# Patient Record
Sex: FEMALE | Race: WHITE | NOT HISPANIC OR LATINO | Employment: UNEMPLOYED | URBAN - METROPOLITAN AREA
[De-identification: names, ages, dates, MRNs, and addresses within clinical notes are randomized per-mention and may not be internally consistent; named-entity substitution may affect disease eponyms.]

---

## 2019-01-16 ENCOUNTER — OFFICE VISIT (OUTPATIENT)
Dept: FAMILY MEDICINE | Facility: CLINIC | Age: 34
End: 2019-01-16
Payer: COMMERCIAL

## 2019-01-16 ENCOUNTER — LAB VISIT (OUTPATIENT)
Dept: LAB | Facility: HOSPITAL | Age: 34
End: 2019-01-16
Attending: FAMILY MEDICINE
Payer: COMMERCIAL

## 2019-01-16 VITALS
WEIGHT: 164 LBS | DIASTOLIC BLOOD PRESSURE: 106 MMHG | SYSTOLIC BLOOD PRESSURE: 152 MMHG | HEIGHT: 65 IN | TEMPERATURE: 98 F | HEART RATE: 77 BPM | BODY MASS INDEX: 27.32 KG/M2 | OXYGEN SATURATION: 97 %

## 2019-01-16 DIAGNOSIS — Z00.00 ROUTINE PHYSICAL EXAMINATION: ICD-10-CM

## 2019-01-16 DIAGNOSIS — Z30.09 BIRTH CONTROL COUNSELING: ICD-10-CM

## 2019-01-16 DIAGNOSIS — F41.9 ANXIETY: ICD-10-CM

## 2019-01-16 DIAGNOSIS — R03.0 ELEVATED BLOOD PRESSURE READING: ICD-10-CM

## 2019-01-16 DIAGNOSIS — Z00.00 ROUTINE PHYSICAL EXAMINATION: Primary | ICD-10-CM

## 2019-01-16 DIAGNOSIS — N39.0 URINARY TRACT INFECTION WITHOUT HEMATURIA, SITE UNSPECIFIED: ICD-10-CM

## 2019-01-16 DIAGNOSIS — K21.9 GASTROESOPHAGEAL REFLUX DISEASE, ESOPHAGITIS PRESENCE NOT SPECIFIED: ICD-10-CM

## 2019-01-16 DIAGNOSIS — Z01.419 ENCOUNTER FOR GYNECOLOGICAL EXAMINATION WITHOUT ABNORMAL FINDING: ICD-10-CM

## 2019-01-16 DIAGNOSIS — G47.00 INSOMNIA, UNSPECIFIED TYPE: ICD-10-CM

## 2019-01-16 LAB
25(OH)D3+25(OH)D2 SERPL-MCNC: 19 NG/ML
ALBUMIN SERPL BCP-MCNC: 4.1 G/DL
ALP SERPL-CCNC: 71 U/L
ALT SERPL W/O P-5'-P-CCNC: 8 U/L
ANION GAP SERPL CALC-SCNC: 10 MMOL/L
AST SERPL-CCNC: 16 U/L
B-HCG UR QL: NEGATIVE
BASOPHILS # BLD AUTO: 0.07 K/UL
BASOPHILS NFR BLD: 1.5 %
BILIRUB SERPL-MCNC: 0.3 MG/DL
BUN SERPL-MCNC: 6 MG/DL
CALCIUM SERPL-MCNC: 9.4 MG/DL
CHLORIDE SERPL-SCNC: 104 MMOL/L
CHOLEST SERPL-MCNC: 197 MG/DL
CHOLEST/HDLC SERPL: 3 {RATIO}
CO2 SERPL-SCNC: 26 MMOL/L
CREAT SERPL-MCNC: 0.8 MG/DL
CTP QC/QA: YES
DIFFERENTIAL METHOD: ABNORMAL
EOSINOPHIL # BLD AUTO: 0.2 K/UL
EOSINOPHIL NFR BLD: 3.5 %
ERYTHROCYTE [DISTWIDTH] IN BLOOD BY AUTOMATED COUNT: 14.9 %
EST. GFR  (AFRICAN AMERICAN): >60 ML/MIN/1.73 M^2
EST. GFR  (NON AFRICAN AMERICAN): >60 ML/MIN/1.73 M^2
ESTIMATED AVG GLUCOSE: 100 MG/DL
GLUCOSE SERPL-MCNC: 88 MG/DL
HBA1C MFR BLD HPLC: 5.1 %
HCT VFR BLD AUTO: 33.8 %
HDLC SERPL-MCNC: 65 MG/DL
HDLC SERPL: 33 %
HGB BLD-MCNC: 9.9 G/DL
IMM GRANULOCYTES # BLD AUTO: 0.01 K/UL
IMM GRANULOCYTES NFR BLD AUTO: 0.2 %
INR PPP: 1
LDLC SERPL CALC-MCNC: 114.2 MG/DL
LYMPHOCYTES # BLD AUTO: 1.4 K/UL
LYMPHOCYTES NFR BLD: 29.5 %
MCH RBC QN AUTO: 22.3 PG
MCHC RBC AUTO-ENTMCNC: 29.3 G/DL
MCV RBC AUTO: 76 FL
MONOCYTES # BLD AUTO: 0.4 K/UL
MONOCYTES NFR BLD: 7.3 %
NEUTROPHILS # BLD AUTO: 2.8 K/UL
NEUTROPHILS NFR BLD: 58 %
NONHDLC SERPL-MCNC: 132 MG/DL
NRBC BLD-RTO: 0 /100 WBC
PLATELET # BLD AUTO: 308 K/UL
PMV BLD AUTO: 12.7 FL
POTASSIUM SERPL-SCNC: 4 MMOL/L
PROT SERPL-MCNC: 7.7 G/DL
PROTHROMBIN TIME: 10.7 SEC
RBC # BLD AUTO: 4.43 M/UL
SODIUM SERPL-SCNC: 140 MMOL/L
T4 FREE SERPL-MCNC: 1 NG/DL
TRIGL SERPL-MCNC: 89 MG/DL
TSH SERPL DL<=0.005 MIU/L-ACNC: 0.86 UIU/ML
WBC # BLD AUTO: 4.81 K/UL

## 2019-01-16 PROCEDURE — 99999 PR PBB SHADOW E&M-NEW PATIENT-LVL III: ICD-10-PCS | Mod: PBBFAC,,, | Performed by: FAMILY MEDICINE

## 2019-01-16 PROCEDURE — 84439 ASSAY OF FREE THYROXINE: CPT

## 2019-01-16 PROCEDURE — 99385 PREV VISIT NEW AGE 18-39: CPT | Mod: S$GLB,,, | Performed by: FAMILY MEDICINE

## 2019-01-16 PROCEDURE — 99385 PR PREVENTIVE VISIT,NEW,18-39: ICD-10-PCS | Mod: S$GLB,,, | Performed by: FAMILY MEDICINE

## 2019-01-16 PROCEDURE — 85610 PROTHROMBIN TIME: CPT

## 2019-01-16 PROCEDURE — 81025 POCT URINE PREGNANCY: ICD-10-PCS | Mod: S$GLB,,, | Performed by: FAMILY MEDICINE

## 2019-01-16 PROCEDURE — 82306 VITAMIN D 25 HYDROXY: CPT

## 2019-01-16 PROCEDURE — 99999 PR PBB SHADOW E&M-NEW PATIENT-LVL III: CPT | Mod: PBBFAC,,, | Performed by: FAMILY MEDICINE

## 2019-01-16 PROCEDURE — 80053 COMPREHEN METABOLIC PANEL: CPT

## 2019-01-16 PROCEDURE — 84443 ASSAY THYROID STIM HORMONE: CPT

## 2019-01-16 PROCEDURE — 85025 COMPLETE CBC W/AUTO DIFF WBC: CPT

## 2019-01-16 PROCEDURE — 83036 HEMOGLOBIN GLYCOSYLATED A1C: CPT

## 2019-01-16 PROCEDURE — 36415 COLL VENOUS BLD VENIPUNCTURE: CPT | Mod: PO

## 2019-01-16 PROCEDURE — 81025 URINE PREGNANCY TEST: CPT | Mod: S$GLB,,, | Performed by: FAMILY MEDICINE

## 2019-01-16 PROCEDURE — 80061 LIPID PANEL: CPT

## 2019-01-16 RX ORDER — PANTOPRAZOLE SODIUM 40 MG/1
40 TABLET, DELAYED RELEASE ORAL DAILY
Qty: 90 TABLET | Refills: 3 | Status: SHIPPED | OUTPATIENT
Start: 2019-01-16

## 2019-01-16 RX ORDER — MEDROXYPROGESTERONE ACETATE 150 MG/ML
150 INJECTION, SUSPENSION INTRAMUSCULAR
Status: SHIPPED | OUTPATIENT
Start: 2019-01-16 | End: 2020-07-09

## 2019-01-16 RX ORDER — ESCITALOPRAM OXALATE 20 MG/1
20 TABLET ORAL DAILY
Qty: 90 TABLET | Refills: 3 | Status: SHIPPED | OUTPATIENT
Start: 2019-01-16

## 2019-01-16 RX ORDER — FLUTICASONE PROPIONATE 50 MCG
1 SPRAY, SUSPENSION (ML) NASAL DAILY
COMMUNITY

## 2019-01-16 RX ORDER — TRAZODONE HYDROCHLORIDE 100 MG/1
100 TABLET ORAL NIGHTLY
Qty: 90 TABLET | Refills: 3 | Status: SHIPPED | OUTPATIENT
Start: 2019-01-16

## 2019-01-16 RX ORDER — TRAZODONE HYDROCHLORIDE 50 MG/1
50 TABLET ORAL NIGHTLY
COMMUNITY
End: 2019-01-16 | Stop reason: SDUPTHER

## 2019-01-16 RX ORDER — ESCITALOPRAM OXALATE 20 MG/1
20 TABLET ORAL DAILY
COMMUNITY
End: 2019-01-16 | Stop reason: SDUPTHER

## 2019-01-16 RX ORDER — NITROFURANTOIN 25; 75 MG/1; MG/1
100 CAPSULE ORAL 2 TIMES DAILY
Qty: 14 CAPSULE | Refills: 0 | Status: SHIPPED | OUTPATIENT
Start: 2019-01-16

## 2019-01-16 RX ORDER — PANTOPRAZOLE SODIUM 40 MG/1
40 TABLET, DELAYED RELEASE ORAL DAILY
COMMUNITY
End: 2019-01-16 | Stop reason: SDUPTHER

## 2019-01-16 NOTE — PROGRESS NOTES
[facility]  Progress Note    SUBJECTIVE:     Chief Complaint   Patient presents with    [facility]    Annual Exam       HPI   Sandra Morales  is a 33 y.o. female here to [facility] and for annual exam. Would like to discuss birth control. Patient has no other new complaints/problems at this time.      Review of patient's allergies indicates:   Allergen Reactions    Pcn [penicillins] Anaphylaxis    Keflex [cephalexin] Itching       Past Medical History:   Diagnosis Date    Anxiety     Depression     PCOS (polycystic ovarian syndrome) 2010     Past Surgical History:   Procedure Laterality Date    DERMOID CYST  EXCISION  2012    gastric sleve  05/2015    WISDOM TOOTH EXTRACTION  2008     Family History   Problem Relation Age of Onset    Hypertension Mother     Hypertension Father      Social History     Tobacco Use    Smoking status: Never Smoker    Smokeless tobacco: Never Used   Substance Use Topics    Alcohol use: No     Frequency: Never    Drug use: No        Review of Systems   Constitutional: Negative for chills, diaphoresis and fever.   HENT: Negative for congestion, ear pain and sore throat.    Eyes: Negative for photophobia and discharge.   Respiratory: Negative for cough, shortness of breath and wheezing.    Cardiovascular: Negative for chest pain and palpitations.   Gastrointestinal: Negative for abdominal pain, constipation, diarrhea, nausea and vomiting.   Genitourinary: Negative for dysuria and hematuria.   Musculoskeletal: Positive for joint pain (L shoulder). Negative for back pain and myalgias.   Skin: Negative for itching and rash.   Neurological: Negative for dizziness, sensory change, focal weakness, weakness and headaches.   All other systems reviewed and are negative.    OBJECTIVE:     Vitals:    01/16/19 1121   BP: (!) 152/106   Pulse: 77   Temp: 97.9 °F (36.6 °C)     Body mass index is 27.29 kg/m².    Physical Exam   Constitutional: She is oriented to person,  place, and time and well-developed, well-nourished, and in no distress. No distress.   HENT:   Head: Normocephalic and atraumatic.   Right Ear: Tympanic membrane is not perforated, not erythematous and not bulging. No hemotympanum.   Left Ear: Tympanic membrane is not perforated, not erythematous and not bulging. No hemotympanum.   Mouth/Throat: Oropharynx is clear and moist. No oropharyngeal exudate.   Eyes: Conjunctivae and EOM are normal. Pupils are equal, round, and reactive to light.   Neck: No thyromegaly present.   Cardiovascular: Normal rate, regular rhythm and normal heart sounds. Exam reveals no gallop and no friction rub.   No murmur heard.  Pulmonary/Chest: Effort normal and breath sounds normal. No respiratory distress. She has no wheezes. She has no rales.   Abdominal: Soft. Bowel sounds are normal. She exhibits no distension. There is no tenderness. There is no rebound and no guarding.   Musculoskeletal: Normal range of motion. She exhibits tenderness (L biceps tendon tenderness. good ROM. no obvious fractures, dislocations.). She exhibits no edema.   Lymphadenopathy:     She has no cervical adenopathy.   Neurological: She is alert and oriented to person, place, and time.   Skin: Skin is warm. No rash noted. She is not diaphoretic. No erythema.       Old records were reviewed. Labs and/or images were independently reviewed.    ASSESSMENT     1. Routine physical examination    2. Elevated blood pressure reading    3. Birth control counseling    4. Insomnia, unspecified type    5. Gastroesophageal reflux disease, esophagitis presence not specified    6. Anxiety    7. Encounter for gynecological examination without abnormal finding        PLAN:     Routine physical examination  -     CBC auto differential; Future  -     Comprehensive metabolic panel; Future  -     Hemoglobin A1c; Future  -     Lipid panel; Future  -     TSH; Future  -     T4, free; Future  -     Vitamin D; Future; Expected date:  01/16/2019  -     Protime-INR; Future; Expected date: 01/16/2019  -     We briefly discussed diet, exercise, and routine preventive exams. All questions and comments addressed.    Elevated blood pressure reading   -     Recommend checking BP. If persistent discussed BP meds. Agrees with plan (was taking sudafed recently)    Birth control counseling  -     POCT urine pregnancy  -     medroxyPROGESTERone (DEPO-PROVERA) injection 150 mg    Insomnia, unspecified type  -     traZODone (DESYREL) 100 MG tablet; Take 1 tablet (100 mg total) by mouth every evening.  Dispense: 90 tablet; Refill: 3  -     Stable. Continue current regimen.    Gastroesophageal reflux disease, esophagitis presence not specified  -     pantoprazole (PROTONIX) 40 MG tablet; Take 1 tablet (40 mg total) by mouth once daily.  Dispense: 90 tablet; Refill: 3  -     Stable. Continue current regimen.    Anxiety  -     escitalopram oxalate (LEXAPRO) 20 MG tablet; Take 1 tablet (20 mg total) by mouth once daily.  Dispense: 90 tablet; Refill: 3  -     Stable. Continue current regimen.    Encounter for gynecological examination without abnormal finding  -     Ambulatory referral to Obstetrics / Gynecology    UTI   -     Sent macrobid to pharmacy.     RTC EMELYN Lan MD  01/16/2019 12:01 PM

## 2019-01-22 ENCOUNTER — TELEPHONE (OUTPATIENT)
Dept: UROLOGY | Facility: CLINIC | Age: 34
End: 2019-01-22

## 2019-01-22 NOTE — TELEPHONE ENCOUNTER
Spoke to patient and gave her the results or lab findings .. She understood and was fine with the suggestions.. AYDEE.

## 2019-01-22 NOTE — TELEPHONE ENCOUNTER
Letter was already made to be mailed home.     Vitamin D slightly low and blood counts show anemia (likely iron deficiency). Recommend vitamin D 2000 IU daily (OTC), and iron tablets daily to improve it. Recheck in 3 months.

## 2019-01-22 NOTE — TELEPHONE ENCOUNTER
----- Message from Zo Morales sent at 1/22/2019  1:42 PM CST -----  Patient is requesting lab results. Thank you!

## 2019-02-21 ENCOUNTER — TELEPHONE (OUTPATIENT)
Dept: FAMILY MEDICINE | Facility: CLINIC | Age: 34
End: 2019-02-21
Payer: COMMERCIAL

## 2019-02-21 DIAGNOSIS — Z30.013 ENCOUNTER FOR INITIAL PRESCRIPTION OF INJECTABLE CONTRACEPTIVE: Primary | ICD-10-CM

## 2019-02-21 PROCEDURE — 81025 URINE PREGNANCY TEST: CPT | Mod: S$GLB,,, | Performed by: FAMILY MEDICINE

## 2019-02-21 PROCEDURE — 81025 POCT URINE PREGNANCY: ICD-10-PCS | Mod: S$GLB,,, | Performed by: FAMILY MEDICINE

## 2019-02-21 NOTE — TELEPHONE ENCOUNTER
Spoke with patient and she did not receive Depo Provera injection on 1/16/19 due to her having a UTI. She later started her menstrual cycle and now has stopped and would like to start injection. Spoke with Dr Lan about doing UPT before injection and this was approved. Patient is aware that she has to do UPT before injection on tomorrow.

## 2019-02-21 NOTE — TELEPHONE ENCOUNTER
----- Message from Roberta Gorman sent at 2/21/2019  1:09 PM CST -----  Contact: Pt  Pt is requesting a callback regarding her birth control     Pt can be reached at [phone] thanks

## 2019-02-22 ENCOUNTER — CLINICAL SUPPORT (OUTPATIENT)
Dept: FAMILY MEDICINE | Facility: CLINIC | Age: 34
End: 2019-02-22
Payer: COMMERCIAL

## 2019-02-22 DIAGNOSIS — Z30.013 ENCOUNTER FOR INITIAL PRESCRIPTION OF INJECTABLE CONTRACEPTIVE: Primary | ICD-10-CM

## 2019-02-22 LAB
B-HCG UR QL: NEGATIVE
CTP QC/QA: YES

## 2019-02-22 PROCEDURE — 96372 PR INJECTION,THERAP/PROPH/DIAG2ST, IM OR SUBCUT: ICD-10-PCS | Mod: S$GLB,,, | Performed by: FAMILY MEDICINE

## 2019-02-22 PROCEDURE — 99499 NO LOS: ICD-10-PCS | Mod: S$GLB,,, | Performed by: FAMILY MEDICINE

## 2019-02-22 PROCEDURE — 99499 UNLISTED E&M SERVICE: CPT | Mod: S$GLB,,, | Performed by: FAMILY MEDICINE

## 2019-02-22 PROCEDURE — 96372 THER/PROPH/DIAG INJ SC/IM: CPT | Mod: S$GLB,,, | Performed by: FAMILY MEDICINE

## 2019-02-22 RX ADMIN — MEDROXYPROGESTERONE ACETATE 150 MG: 150 INJECTION, SUSPENSION INTRAMUSCULAR at 09:02

## 2019-02-22 NOTE — PROGRESS NOTES
Urine Pregnancy Test completed resulted negative. Depo-Provera 150 mg injection given. Tolerated well, instructed to wait 15 min for observation. No reaction noted at discharge.

## 2020-08-14 DIAGNOSIS — Z11.59 NEED FOR HEPATITIS C SCREENING TEST: ICD-10-CM
